# Patient Record
Sex: FEMALE | Race: OTHER | URBAN - METROPOLITAN AREA
[De-identification: names, ages, dates, MRNs, and addresses within clinical notes are randomized per-mention and may not be internally consistent; named-entity substitution may affect disease eponyms.]

---

## 2019-07-15 ENCOUNTER — OFFICE VISIT (OUTPATIENT)
Dept: ENDOCRINOLOGY CLINIC | Facility: CLINIC | Age: 19
End: 2019-07-15
Payer: COMMERCIAL

## 2019-07-15 VITALS
DIASTOLIC BLOOD PRESSURE: 95 MMHG | SYSTOLIC BLOOD PRESSURE: 132 MMHG | HEART RATE: 67 BPM | WEIGHT: 263.19 LBS | HEIGHT: 69 IN | BODY MASS INDEX: 38.98 KG/M2

## 2019-07-15 DIAGNOSIS — Z13.220 LIPID SCREENING: ICD-10-CM

## 2019-07-15 DIAGNOSIS — R79.89 HIGH SERUM TESTOSTERONE: ICD-10-CM

## 2019-07-15 DIAGNOSIS — Z13.1 DIABETES MELLITUS SCREENING: Primary | ICD-10-CM

## 2019-07-15 DIAGNOSIS — N97.0 ANOVULATORY: ICD-10-CM

## 2019-07-15 LAB
CARTRIDGE LOT#: NORMAL NUMERIC
GLUCOSE BLOOD: 95
HEMOGLOBIN A1C: 5.4 % (ref 4.3–5.6)
TEST STRIP LOT #: NORMAL NUMERIC

## 2019-07-15 PROCEDURE — 83036 HEMOGLOBIN GLYCOSYLATED A1C: CPT | Performed by: INTERNAL MEDICINE

## 2019-07-15 PROCEDURE — 99203 OFFICE O/P NEW LOW 30 MIN: CPT | Performed by: INTERNAL MEDICINE

## 2019-07-15 PROCEDURE — 82962 GLUCOSE BLOOD TEST: CPT | Performed by: INTERNAL MEDICINE

## 2019-07-15 PROCEDURE — 36416 COLLJ CAPILLARY BLOOD SPEC: CPT | Performed by: INTERNAL MEDICINE

## 2019-07-15 RX ORDER — METFORMIN HYDROCHLORIDE 500 MG/1
500 TABLET, EXTENDED RELEASE ORAL 2 TIMES DAILY WITH MEALS
Qty: 180 TABLET | Refills: 0 | Status: SHIPPED | OUTPATIENT
Start: 2019-07-15 | End: 2019-10-10

## 2019-07-15 NOTE — H&P
New Patient Evaluation - History and Physical    CONSULT - Reason for Visit:  PCOS  Requesting Physician: Self referral    CHIEF COMPLAINT:  Patient presents with:  Consult: Darkening around neck, inner arms and armpits. DM screening.  Diagnosed with PCOS i Allergies    SOCIAL HISTORY:    Social History    Socioeconomic History      Marital status: Single      Spouse name: Not on file      Number of children: Not on file      Years of education: Not on file      Highest education level: Not on file    Tobacco lymphadenopathy  LUNGS: clear to auscultation bilaterally, no crackles or wheezing  CARDIOVASCULAR:  regular rate and rhythm, normal S1 and S2  ABDOMEN:  soft  SKIN:  no bruising or bleeding, no rashes and no lesions, + darkening of skin around neck  EXTRE

## 2019-07-26 ENCOUNTER — TELEPHONE (OUTPATIENT)
Dept: ENDOCRINOLOGY CLINIC | Facility: CLINIC | Age: 19
End: 2019-07-26

## 2019-07-26 PROBLEM — E28.2 PCOS (POLYCYSTIC OVARIAN SYNDROME): Status: ACTIVE | Noted: 2019-07-26

## 2019-10-10 ENCOUNTER — TELEPHONE (OUTPATIENT)
Dept: ENDOCRINOLOGY CLINIC | Facility: CLINIC | Age: 19
End: 2019-10-10

## 2019-10-11 RX ORDER — METFORMIN HYDROCHLORIDE 500 MG/1
TABLET, EXTENDED RELEASE ORAL
Qty: 180 TABLET | Refills: 0 | Status: SHIPPED | OUTPATIENT
Start: 2019-10-11 | End: 2020-01-27

## 2020-01-25 ENCOUNTER — TELEPHONE (OUTPATIENT)
Dept: ENDOCRINOLOGY CLINIC | Facility: CLINIC | Age: 20
End: 2020-01-25

## 2020-01-27 RX ORDER — METFORMIN HYDROCHLORIDE 500 MG/1
TABLET, EXTENDED RELEASE ORAL
Qty: 60 TABLET | Refills: 0 | Status: SHIPPED | OUTPATIENT
Start: 2020-01-27

## 2021-10-08 ENCOUNTER — LAB REQUISITION (OUTPATIENT)
Dept: LAB | Age: 21
End: 2021-10-08

## 2021-10-08 DIAGNOSIS — Z30.09 ENCOUNTER FOR OTHER GENERAL COUNSELING AND ADVICE ON CONTRACEPTION: ICD-10-CM

## 2021-10-08 PROCEDURE — 84146 ASSAY OF PROLACTIN: CPT | Performed by: CLINICAL MEDICAL LABORATORY

## 2021-10-08 PROCEDURE — 84443 ASSAY THYROID STIM HORMONE: CPT | Performed by: CLINICAL MEDICAL LABORATORY

## 2021-10-09 LAB
PROLACTIN SERPL-MCNC: 5.3 NG/ML (ref 2.8–29.2)
TSH SERPL-ACNC: 1.28 MCUNITS/ML (ref 0.35–5)

## 2022-03-27 ENCOUNTER — WALK IN (OUTPATIENT)
Dept: URGENT CARE | Age: 22
End: 2022-03-27

## 2022-03-27 VITALS
SYSTOLIC BLOOD PRESSURE: 122 MMHG | DIASTOLIC BLOOD PRESSURE: 84 MMHG | WEIGHT: 265 LBS | TEMPERATURE: 98.2 F | RESPIRATION RATE: 16 BRPM | OXYGEN SATURATION: 96 % | HEART RATE: 75 BPM

## 2022-03-27 DIAGNOSIS — J03.90 TONSILLITIS WITH EXUDATE: Primary | ICD-10-CM

## 2022-03-27 PROCEDURE — 99203 OFFICE O/P NEW LOW 30 MIN: CPT | Performed by: NURSE PRACTITIONER

## 2022-03-27 PROCEDURE — 87070 CULTURE OTHR SPECIMN AEROBIC: CPT | Performed by: INTERNAL MEDICINE

## 2022-03-27 RX ORDER — LIDOCAINE HYDROCHLORIDE 20 MG/ML
15 SOLUTION OROPHARYNGEAL
Qty: 200 ML | Refills: 0 | Status: SHIPPED | OUTPATIENT
Start: 2022-03-27 | End: 2022-04-01

## 2022-03-27 RX ORDER — DEXAMETHASONE 4 MG/1
8 TABLET ORAL ONCE
Qty: 2 TABLET | Refills: 0 | Status: SHIPPED | OUTPATIENT
Start: 2022-03-27 | End: 2022-03-27

## 2022-03-27 RX ORDER — AMOXICILLIN AND CLAVULANATE POTASSIUM 875; 125 MG/1; MG/1
1 TABLET, FILM COATED ORAL EVERY 12 HOURS
Qty: 20 TABLET | Refills: 0 | Status: SHIPPED | OUTPATIENT
Start: 2022-03-27

## 2022-03-27 ASSESSMENT — ENCOUNTER SYMPTOMS: SORE THROAT: 1

## 2022-03-27 ASSESSMENT — PAIN SCALES - GENERAL: PAINLEVEL: 9

## 2022-03-29 ENCOUNTER — TELEPHONE (OUTPATIENT)
Dept: URGENT CARE | Age: 22
End: 2022-03-29

## 2022-03-29 LAB — BACTERIA THROAT AEROBE CULT: NORMAL

## 2022-06-27 ENCOUNTER — HOSPITAL ENCOUNTER (EMERGENCY)
Facility: HOSPITAL | Age: 22
Discharge: HOME | End: 2022-06-27
Attending: EMERGENCY MEDICINE
Payer: COMMERCIAL

## 2022-06-27 VITALS
HEART RATE: 69 BPM | SYSTOLIC BLOOD PRESSURE: 120 MMHG | DIASTOLIC BLOOD PRESSURE: 83 MMHG | RESPIRATION RATE: 16 BRPM | OXYGEN SATURATION: 95 % | BODY MASS INDEX: 39.25 KG/M2 | TEMPERATURE: 98 F | WEIGHT: 265 LBS | HEIGHT: 69 IN

## 2022-06-27 DIAGNOSIS — R42 DIZZINESS: Primary | ICD-10-CM

## 2022-06-27 LAB
B-HCG UR QL: NEGATIVE
BILIRUB UR QL: NEGATIVE
CLARITY UR: CLEAR
COLOR UR: YELLOW
GLUCOSE UR-MCNC: NEGATIVE MG/DL
KETONES UR-MCNC: NEGATIVE MG/DL
LEUKOCYTE ESTERASE UR QL STRIP.AUTO: NEGATIVE
NITRITE UR QL STRIP.AUTO: NEGATIVE
PH UR: 5.5 [PH] (ref 5–8)
PROT UR-MCNC: NEGATIVE MG/DL
SP GR UR STRIP: 1.02 (ref 1–1.03)
UROBILINOGEN UR STRIP-ACNC: 0.2

## 2022-06-27 PROCEDURE — 99283 EMERGENCY DEPT VISIT LOW MDM: CPT

## 2022-06-27 PROCEDURE — 81025 URINE PREGNANCY TEST: CPT

## 2022-06-27 NOTE — ED INITIAL ASSESSMENT (HPI)
Pt states intermittent dizziness since 2 pm. Pt states she was outside in warm weather and has been drinking alcohol this weekend. Pt believes it could be heat exhaustion. Pt currently taking prednisone and azithromycin for bronchitis.  NAD

## 2022-09-07 ENCOUNTER — WALK IN (OUTPATIENT)
Dept: URGENT CARE | Age: 22
End: 2022-09-07

## 2022-09-07 VITALS
OXYGEN SATURATION: 96 % | WEIGHT: 253 LBS | RESPIRATION RATE: 16 BRPM | SYSTOLIC BLOOD PRESSURE: 139 MMHG | HEIGHT: 69 IN | TEMPERATURE: 98.4 F | HEART RATE: 75 BPM | BODY MASS INDEX: 37.47 KG/M2 | DIASTOLIC BLOOD PRESSURE: 95 MMHG

## 2022-09-07 DIAGNOSIS — Z20.828 EXPOSURE TO HERPES SIMPLEX VIRUS (HSV): Primary | ICD-10-CM

## 2022-09-07 DIAGNOSIS — N76.0 ACUTE VAGINITIS: ICD-10-CM

## 2022-09-07 DIAGNOSIS — Z11.3 SCREENING EXAMINATION FOR STD (SEXUALLY TRANSMITTED DISEASE): ICD-10-CM

## 2022-09-07 DIAGNOSIS — Z20.2 POSSIBLE EXPOSURE TO STD: ICD-10-CM

## 2022-09-07 PROCEDURE — 99214 OFFICE O/P EST MOD 30 MIN: CPT | Performed by: FAMILY MEDICINE

## 2022-09-07 RX ORDER — VALACYCLOVIR HYDROCHLORIDE 1 G/1
1000 TABLET, FILM COATED ORAL 2 TIMES DAILY
Qty: 28 TABLET | Refills: 0 | Status: SHIPPED | OUTPATIENT
Start: 2022-09-07 | End: 2022-09-21

## 2022-09-07 RX ORDER — METFORMIN HYDROCHLORIDE 500 MG/1
500 TABLET, EXTENDED RELEASE ORAL DAILY
COMMUNITY
Start: 2022-07-27 | End: 2023-07-27

## 2022-09-07 RX ORDER — ESCITALOPRAM OXALATE 10 MG/1
10 TABLET ORAL DAILY
COMMUNITY
Start: 2022-07-27

## 2023-06-19 ENCOUNTER — OFFICE VISIT (OUTPATIENT)
Dept: INTERNAL MEDICINE CLINIC | Facility: CLINIC | Age: 23
End: 2023-06-19

## 2023-06-19 ENCOUNTER — LAB ENCOUNTER (OUTPATIENT)
Dept: LAB | Age: 23
End: 2023-06-19
Payer: COMMERCIAL

## 2023-06-19 VITALS
RESPIRATION RATE: 20 BRPM | DIASTOLIC BLOOD PRESSURE: 82 MMHG | TEMPERATURE: 98 F | WEIGHT: 254 LBS | HEART RATE: 80 BPM | BODY MASS INDEX: 37.62 KG/M2 | SYSTOLIC BLOOD PRESSURE: 134 MMHG | HEIGHT: 69 IN | OXYGEN SATURATION: 98 %

## 2023-06-19 DIAGNOSIS — Z91.89 HAS MULTIPLE SEXUAL PARTNERS: ICD-10-CM

## 2023-06-19 DIAGNOSIS — Z11.3 SCREEN FOR STD (SEXUALLY TRANSMITTED DISEASE): Primary | ICD-10-CM

## 2023-06-19 DIAGNOSIS — A60.09 HERPES SIMPLEX OF FEMALE GENITALIA: ICD-10-CM

## 2023-06-19 DIAGNOSIS — Z11.3 SCREEN FOR STD (SEXUALLY TRANSMITTED DISEASE): ICD-10-CM

## 2023-06-19 PROCEDURE — 87389 HIV-1 AG W/HIV-1&-2 AB AG IA: CPT

## 2023-06-19 PROCEDURE — 87491 CHLMYD TRACH DNA AMP PROBE: CPT

## 2023-06-19 PROCEDURE — 87340 HEPATITIS B SURFACE AG IA: CPT

## 2023-06-19 PROCEDURE — 86803 HEPATITIS C AB TEST: CPT

## 2023-06-19 PROCEDURE — 86780 TREPONEMA PALLIDUM: CPT

## 2023-06-19 PROCEDURE — 87591 N.GONORRHOEAE DNA AMP PROB: CPT

## 2023-06-19 PROCEDURE — 36415 COLL VENOUS BLD VENIPUNCTURE: CPT

## 2023-06-19 RX ORDER — EMTRICITABINE AND TENOFOVIR DISOPROXIL FUMARATE 200; 300 MG/1; MG/1
1 TABLET, FILM COATED ORAL DAILY
Qty: 90 TABLET | Refills: 0 | Status: SHIPPED | OUTPATIENT
Start: 2023-06-19

## 2023-06-19 RX ORDER — VALACYCLOVIR HYDROCHLORIDE 500 MG/1
500 TABLET, FILM COATED ORAL 2 TIMES DAILY
Qty: 6 TABLET | Refills: 0 | Status: SHIPPED | OUTPATIENT
Start: 2023-06-19 | End: 2023-06-22

## 2023-06-19 RX ORDER — FLUTICASONE PROPIONATE 110 UG/1
2 AEROSOL, METERED RESPIRATORY (INHALATION) 2 TIMES DAILY
COMMUNITY
Start: 2022-07-27 | End: 2023-07-27

## 2023-06-20 LAB
C TRACH DNA SPEC QL NAA+PROBE: NEGATIVE
HBV SURFACE AG SER-ACNC: 0.11 [IU]/L
HBV SURFACE AG SERPL QL IA: NONREACTIVE
HCV AB SERPL QL IA: NONREACTIVE
N GONORRHOEA DNA SPEC QL NAA+PROBE: NEGATIVE

## 2023-06-21 LAB — T PALLIDUM AB SER QL: NEGATIVE

## 2023-08-23 ENCOUNTER — OFFICE VISIT (OUTPATIENT)
Dept: INTERNAL MEDICINE CLINIC | Facility: CLINIC | Age: 23
End: 2023-08-23

## 2023-08-23 ENCOUNTER — LAB ENCOUNTER (OUTPATIENT)
Dept: LAB | Age: 23
End: 2023-08-23
Payer: COMMERCIAL

## 2023-08-23 VITALS
SYSTOLIC BLOOD PRESSURE: 116 MMHG | HEART RATE: 84 BPM | WEIGHT: 260 LBS | HEIGHT: 69 IN | BODY MASS INDEX: 38.51 KG/M2 | OXYGEN SATURATION: 99 % | DIASTOLIC BLOOD PRESSURE: 74 MMHG

## 2023-08-23 DIAGNOSIS — J45.21 INTERMITTENT ASTHMA WITH ACUTE EXACERBATION, UNSPECIFIED ASTHMA SEVERITY: ICD-10-CM

## 2023-08-23 DIAGNOSIS — Z11.3 SCREEN FOR STD (SEXUALLY TRANSMITTED DISEASE): ICD-10-CM

## 2023-08-23 DIAGNOSIS — R06.2 WHEEZING: ICD-10-CM

## 2023-08-23 DIAGNOSIS — R05.1 ACUTE COUGH: ICD-10-CM

## 2023-08-23 DIAGNOSIS — Z11.3 SCREEN FOR STD (SEXUALLY TRANSMITTED DISEASE): Primary | ICD-10-CM

## 2023-08-23 LAB
HBV SURFACE AG SER-ACNC: <0.1 [IU]/L
HBV SURFACE AG SERPL QL IA: NONREACTIVE
HCV AB SERPL QL IA: NONREACTIVE

## 2023-08-23 PROCEDURE — 86780 TREPONEMA PALLIDUM: CPT

## 2023-08-23 PROCEDURE — 87491 CHLMYD TRACH DNA AMP PROBE: CPT

## 2023-08-23 PROCEDURE — 86803 HEPATITIS C AB TEST: CPT

## 2023-08-23 PROCEDURE — 87389 HIV-1 AG W/HIV-1&-2 AB AG IA: CPT

## 2023-08-23 PROCEDURE — 3074F SYST BP LT 130 MM HG: CPT

## 2023-08-23 PROCEDURE — 87340 HEPATITIS B SURFACE AG IA: CPT

## 2023-08-23 PROCEDURE — 3008F BODY MASS INDEX DOCD: CPT

## 2023-08-23 PROCEDURE — 87591 N.GONORRHOEAE DNA AMP PROB: CPT

## 2023-08-23 PROCEDURE — 3078F DIAST BP <80 MM HG: CPT

## 2023-08-23 PROCEDURE — 36415 COLL VENOUS BLD VENIPUNCTURE: CPT

## 2023-08-23 PROCEDURE — 99214 OFFICE O/P EST MOD 30 MIN: CPT

## 2023-08-23 RX ORDER — BUDESONIDE 90 UG/1
2 AEROSOL, POWDER RESPIRATORY (INHALATION) 2 TIMES DAILY
COMMUNITY
Start: 2023-07-14 | End: 2023-08-23

## 2023-08-23 RX ORDER — ALBUTEROL SULFATE 2.5 MG/3ML
2.5 SOLUTION RESPIRATORY (INHALATION) EVERY 6 HOURS PRN
Qty: 12 ML | Refills: 0 | Status: SHIPPED | OUTPATIENT
Start: 2023-08-23 | End: 2023-09-06

## 2023-08-23 RX ORDER — PREDNISONE 20 MG/1
20 TABLET ORAL 2 TIMES DAILY
Qty: 14 TABLET | Refills: 0 | Status: SHIPPED | OUTPATIENT
Start: 2023-08-23 | End: 2023-08-30

## 2023-08-24 LAB
C TRACH DNA SPEC QL NAA+PROBE: NEGATIVE
N GONORRHOEA DNA SPEC QL NAA+PROBE: NEGATIVE

## 2023-08-25 LAB — T PALLIDUM AB SER QL: NEGATIVE

## 2023-12-05 ENCOUNTER — TELEPHONE (OUTPATIENT)
Dept: INTERNAL MEDICINE CLINIC | Facility: CLINIC | Age: 23
End: 2023-12-05

## 2023-12-05 NOTE — TELEPHONE ENCOUNTER
Future Appointments   Date Time Provider Drew Ornelas   12/6/2023 11:30 AM SONIA Calderón     Left message to call back. Nurse to follow:  Triage tightness in lower lungs.

## 2023-12-05 NOTE — TELEPHONE ENCOUNTER
Patient scheduled an appt via [employer] for the following symptoms:    breathing becomes hard when trying to deeply inhale, feels tight in lower part of lungs.

## 2023-12-06 ENCOUNTER — LAB ENCOUNTER (OUTPATIENT)
Dept: LAB | Age: 23
End: 2023-12-06
Attending: INTERNAL MEDICINE
Payer: COMMERCIAL

## 2023-12-06 ENCOUNTER — OFFICE VISIT (OUTPATIENT)
Dept: INTERNAL MEDICINE CLINIC | Facility: CLINIC | Age: 23
End: 2023-12-06
Payer: COMMERCIAL

## 2023-12-06 VITALS
SYSTOLIC BLOOD PRESSURE: 155 MMHG | BODY MASS INDEX: 40.58 KG/M2 | WEIGHT: 274 LBS | HEIGHT: 69 IN | OXYGEN SATURATION: 96 % | HEART RATE: 90 BPM | DIASTOLIC BLOOD PRESSURE: 110 MMHG

## 2023-12-06 DIAGNOSIS — Z11.3 SCREEN FOR STD (SEXUALLY TRANSMITTED DISEASE): ICD-10-CM

## 2023-12-06 DIAGNOSIS — J45.21 INTERMITTENT ASTHMA WITH ACUTE EXACERBATION, UNSPECIFIED ASTHMA SEVERITY: Primary | ICD-10-CM

## 2023-12-06 DIAGNOSIS — Z23 NEED FOR VACCINATION: ICD-10-CM

## 2023-12-06 PROCEDURE — 99214 OFFICE O/P EST MOD 30 MIN: CPT

## 2023-12-06 PROCEDURE — 90686 IIV4 VACC NO PRSV 0.5 ML IM: CPT

## 2023-12-06 PROCEDURE — 86780 TREPONEMA PALLIDUM: CPT

## 2023-12-06 PROCEDURE — 3080F DIAST BP >= 90 MM HG: CPT

## 2023-12-06 PROCEDURE — 87491 CHLMYD TRACH DNA AMP PROBE: CPT

## 2023-12-06 PROCEDURE — 87340 HEPATITIS B SURFACE AG IA: CPT

## 2023-12-06 PROCEDURE — 3008F BODY MASS INDEX DOCD: CPT

## 2023-12-06 PROCEDURE — 90471 IMMUNIZATION ADMIN: CPT

## 2023-12-06 PROCEDURE — 87591 N.GONORRHOEAE DNA AMP PROB: CPT

## 2023-12-06 PROCEDURE — 90472 IMMUNIZATION ADMIN EACH ADD: CPT

## 2023-12-06 PROCEDURE — 36415 COLL VENOUS BLD VENIPUNCTURE: CPT

## 2023-12-06 PROCEDURE — 90677 PCV20 VACCINE IM: CPT

## 2023-12-06 PROCEDURE — 86803 HEPATITIS C AB TEST: CPT

## 2023-12-06 PROCEDURE — 87389 HIV-1 AG W/HIV-1&-2 AB AG IA: CPT

## 2023-12-06 PROCEDURE — 3077F SYST BP >= 140 MM HG: CPT

## 2023-12-06 RX ORDER — PREDNISONE 20 MG/1
20 TABLET ORAL DAILY
Qty: 7 TABLET | Refills: 0 | Status: SHIPPED | OUTPATIENT
Start: 2023-12-06 | End: 2023-12-13

## 2023-12-06 RX ORDER — BUDESONIDE 90 UG/1
2 AEROSOL, POWDER RESPIRATORY (INHALATION) 2 TIMES DAILY
Qty: 1 EACH | Refills: 0 | Status: SHIPPED | OUTPATIENT
Start: 2023-12-06

## 2023-12-06 RX ORDER — MONTELUKAST SODIUM 10 MG/1
10 TABLET ORAL NIGHTLY
Qty: 90 TABLET | Refills: 0 | Status: SHIPPED | OUTPATIENT
Start: 2023-12-06

## 2023-12-07 LAB
C TRACH DNA SPEC QL NAA+PROBE: NEGATIVE
N GONORRHOEA DNA SPEC QL NAA+PROBE: NEGATIVE

## 2023-12-08 LAB — T PALLIDUM AB SER QL: NEGATIVE

## 2023-12-11 ENCOUNTER — NURSE TRIAGE (OUTPATIENT)
Dept: INTERNAL MEDICINE CLINIC | Facility: CLINIC | Age: 23
End: 2023-12-11

## 2023-12-11 ENCOUNTER — OFFICE VISIT (OUTPATIENT)
Facility: CLINIC | Age: 23
End: 2023-12-11

## 2023-12-11 VITALS
HEART RATE: 65 BPM | WEIGHT: 272 LBS | HEIGHT: 69 IN | BODY MASS INDEX: 40.29 KG/M2 | OXYGEN SATURATION: 97 % | DIASTOLIC BLOOD PRESSURE: 88 MMHG | RESPIRATION RATE: 18 BRPM | SYSTOLIC BLOOD PRESSURE: 140 MMHG

## 2023-12-11 DIAGNOSIS — L02.213 CUTANEOUS ABSCESS OF CHEST WALL: Primary | ICD-10-CM

## 2023-12-11 RX ORDER — SULFAMETHOXAZOLE AND TRIMETHOPRIM 800; 160 MG/1; MG/1
2 TABLET ORAL 2 TIMES DAILY
Qty: 28 TABLET | Refills: 0 | Status: SHIPPED | OUTPATIENT
Start: 2023-12-11 | End: 2023-12-18

## 2023-12-11 NOTE — TELEPHONE ENCOUNTER
Action Requested: Summary for Provider     []  Critical Lab, Recommendations Needed  [] Need Additional Advice  []   FYI    []   Need Orders  [] Need Medications Sent to Pharmacy  []  Other     SUMMARY: Per protocol disposition advised office visit today     Future Appointments   Date Time Provider Drew Ornelas   12/11/2023  1:40 PM Xiomara Denson MD [address]       Reason for call: Breast Problem  Onset: 3 days     Left breast wound x3 days, had what looked like a pimple, patient states she popped it. Symptoms of infection x2 days and include warmth, redness, intermittent yellow-green drainage and mild swelling. No pain. Afebrile. Denies other symptoms marked no under protocol.      Reason for Disposition   Breast looks infected (spreading redness, feels hot or painful to touch) and no fever    Protocols used: Breast Symptoms-A-OH

## 2023-12-12 ENCOUNTER — NURSE TRIAGE (OUTPATIENT)
Facility: CLINIC | Age: 23
End: 2023-12-12

## 2023-12-12 NOTE — TELEPHONE ENCOUNTER
Cal Round with lab called. MRSA swab for breast wound ordered/collected incorrectly. (Ordered as nasal swab).  Need to use E- swab to order culture

## 2024-02-05 ENCOUNTER — OFFICE VISIT (OUTPATIENT)
Dept: INTERNAL MEDICINE CLINIC | Facility: CLINIC | Age: 24
End: 2024-02-05

## 2024-02-05 ENCOUNTER — LAB ENCOUNTER (OUTPATIENT)
Dept: LAB | Age: 24
End: 2024-02-05
Payer: COMMERCIAL

## 2024-02-05 VITALS
WEIGHT: 271 LBS | DIASTOLIC BLOOD PRESSURE: 100 MMHG | BODY MASS INDEX: 40.14 KG/M2 | OXYGEN SATURATION: 97 % | HEIGHT: 69 IN | HEART RATE: 86 BPM | SYSTOLIC BLOOD PRESSURE: 142 MMHG

## 2024-02-05 DIAGNOSIS — R03.0 ELEVATED BLOOD PRESSURE READING IN OFFICE WITHOUT DIAGNOSIS OF HYPERTENSION: ICD-10-CM

## 2024-02-05 DIAGNOSIS — Z11.3 ROUTINE SCREENING FOR STI (SEXUALLY TRANSMITTED INFECTION): ICD-10-CM

## 2024-02-05 DIAGNOSIS — Z00.00 ROUTINE GENERAL MEDICAL EXAMINATION AT A HEALTH CARE FACILITY: ICD-10-CM

## 2024-02-05 DIAGNOSIS — Z11.52 ENCOUNTER FOR SCREENING FOR COVID-19: ICD-10-CM

## 2024-02-05 DIAGNOSIS — Z00.00 ROUTINE GENERAL MEDICAL EXAMINATION AT A HEALTH CARE FACILITY: Primary | ICD-10-CM

## 2024-02-05 LAB
BASOPHILS # BLD AUTO: 0.05 X10(3) UL (ref 0–0.2)
BASOPHILS NFR BLD AUTO: 0.5 %
DEPRECATED RDW RBC AUTO: 37.9 FL (ref 35.1–46.3)
EOSINOPHIL # BLD AUTO: 0.6 X10(3) UL (ref 0–0.7)
EOSINOPHIL NFR BLD AUTO: 5.7 %
ERYTHROCYTE [DISTWIDTH] IN BLOOD BY AUTOMATED COUNT: 11.4 % (ref 11–15)
HBV SURFACE AG SER-ACNC: <0.1 [IU]/L
HBV SURFACE AG SERPL QL IA: NONREACTIVE
HCT VFR BLD AUTO: 45.8 %
HCV AB SERPL QL IA: NONREACTIVE
HGB BLD-MCNC: 16.5 G/DL
IMM GRANULOCYTES # BLD AUTO: 0.03 X10(3) UL (ref 0–1)
IMM GRANULOCYTES NFR BLD: 0.3 %
LYMPHOCYTES # BLD AUTO: 3.17 X10(3) UL (ref 1–4)
LYMPHOCYTES NFR BLD AUTO: 30.4 %
MCH RBC QN AUTO: 32.7 PG (ref 26–34)
MCHC RBC AUTO-ENTMCNC: 36 G/DL (ref 31–37)
MCV RBC AUTO: 90.7 FL
MONOCYTES # BLD AUTO: 0.9 X10(3) UL (ref 0.1–1)
MONOCYTES NFR BLD AUTO: 8.6 %
NEUTROPHILS # BLD AUTO: 5.69 X10 (3) UL (ref 1.5–7.7)
NEUTROPHILS # BLD AUTO: 5.69 X10(3) UL (ref 1.5–7.7)
NEUTROPHILS NFR BLD AUTO: 54.5 %
PLATELET # BLD AUTO: 300 10(3)UL (ref 150–450)
RBC # BLD AUTO: 5.05 X10(6)UL
T PALLIDUM AB SER QL IA: NONREACTIVE
TSI SER-ACNC: 1.97 MIU/ML (ref 0.55–4.78)
WBC # BLD AUTO: 10.4 X10(3) UL (ref 4–11)

## 2024-02-05 PROCEDURE — 87389 HIV-1 AG W/HIV-1&-2 AB AG IA: CPT

## 2024-02-05 PROCEDURE — 86803 HEPATITIS C AB TEST: CPT

## 2024-02-05 PROCEDURE — 84443 ASSAY THYROID STIM HORMONE: CPT

## 2024-02-05 PROCEDURE — 3008F BODY MASS INDEX DOCD: CPT

## 2024-02-05 PROCEDURE — 99395 PREV VISIT EST AGE 18-39: CPT

## 2024-02-05 PROCEDURE — 87635 SARS-COV-2 COVID-19 AMP PRB: CPT

## 2024-02-05 PROCEDURE — 86780 TREPONEMA PALLIDUM: CPT

## 2024-02-05 PROCEDURE — 85025 COMPLETE CBC W/AUTO DIFF WBC: CPT

## 2024-02-05 PROCEDURE — 3080F DIAST BP >= 90 MM HG: CPT

## 2024-02-05 PROCEDURE — 3077F SYST BP >= 140 MM HG: CPT

## 2024-02-05 PROCEDURE — 87491 CHLMYD TRACH DNA AMP PROBE: CPT

## 2024-02-05 PROCEDURE — 87340 HEPATITIS B SURFACE AG IA: CPT

## 2024-02-05 PROCEDURE — 36415 COLL VENOUS BLD VENIPUNCTURE: CPT

## 2024-02-05 PROCEDURE — 87591 N.GONORRHOEAE DNA AMP PROB: CPT

## 2024-02-05 NOTE — PROGRESS NOTES
Subjective:   Dejah Catalan is a 24 year old adult who presents for Lab (Full sti std screening, wants a covid test for traveling) and Physical     Here for physical   Breathing is much better. They stopped the montelukast due to developing mood swings and anger     BP elevated in office and noted elevated at last several visits. Denies headaches, dizziness, chest pain, vision changes. Denies increased stress recently. Does have a new job working at [employer] in the early morning - wakes up at 3am and goes to sleep around 7:30 or 9pm. Hoping this will be a short-term job as in the summer they experience insomnia and cannot fall asleep until 3 or 4am.  Does not add salt to their food or get take out food  No current exercise     Requesting STI screening, no current symptoms. Will be going to an adult convention in March and anticipates multiple sexual partners so would like standing orders for STD testing for before and after the convention. States typically requests sexual partners share test results prior to intercourse and uses condoms with male partners and sometimes uses barrier protection with female partners. Also requesting a COVID test for before and after the convention.  We ordered PrEP in June 2023 but insurance did not cover it so it cost $600 and did not pick it up.     Preventive Care  Pap smear - [facility] and was normal   HPV - 11/04/2022, 09/15/2022 at [facility] - 01/19/2018 and states also done in June 2023    No cigarettes  Cannabis every 3 months   Alcohol none     History/Other:    Chief Complaint Reviewed and Verified  Nursing Notes Reviewed and   Verified  Tobacco Reviewed  Allergies Reviewed  Medications Reviewed    Problem List Reviewed  Medical History Reviewed  Surgical History   Reviewed  OB Status Reviewed  Family History Reviewed         Tobacco:  Dejah Catalan smoked tobacco in the past but quit greater than 12 months ago.  Social  History    Tobacco Use      Smoking status: Former        Types: Cigarettes      Smokeless tobacco: Never       Current Outpatient Medications   Medication Sig Dispense Refill    Budesonide (PULMICORT FLEXHALER) 90 MCG/ACT Inhalation Aerosol Powder, Breath Activated Inhale 2 puffs into the lungs 2 (two) times daily. 1 each 0    METFORMIN HCL  MG Oral Tablet 24 Hr TAKE 1 TABLET BY MOUTH TWICE A DAY WITH MEALS (Patient taking differently: Take 1 tablet (500 mg total) by mouth daily with breakfast.) 60 tablet 0         Review of Systems:  Review of Systems   Constitutional: Negative.    Respiratory: Negative.     Cardiovascular: Negative.    Gastrointestinal: Negative.    Skin: Negative.    Neurological: Negative.      Objective:   BP (!) 142/100   Pulse 86   Ht 5' 9\" (1.753 m)   Wt 271 lb (122.9 kg)   LMP 09/07/2023 (Approximate)   SpO2 97%   BMI 40.02 kg/m²  Estimated body mass index is 40.02 kg/m² as calculated from the following:    Height as of this encounter: 5' 9\" (1.753 m).    Weight as of this encounter: 271 lb (122.9 kg).  Physical Exam  Vitals reviewed.   Constitutional:       General: Dejah Catalan is not in acute distress.     Appearance: Normal appearance. Dejah Catalan is well-developed.   Cardiovascular:      Rate and Rhythm: Normal rate and regular rhythm.      Heart sounds: Normal heart sounds.   Pulmonary:      Effort: Pulmonary effort is normal.      Breath sounds: Normal breath sounds.   Abdominal:      General: Bowel sounds are normal.      Palpations: Abdomen is soft.   Skin:     General: Skin is warm and dry.   Neurological:      Mental Status: Dejah Catalan is alert and oriented to person, place, and time.       Assessment & Plan:   1. Routine general medical examination at a health care facility (Primary)  -     CBC With Differential With Platelet; Future; Expected date: 02/05/2024  -     Comp Metabolic Panel (14); Future; Expected date: 02/05/2024  -     Lipid Panel; Future;  Expected date: 02/05/2024  -     TSH W Reflex To Free T4; Future; Expected date: 02/05/2024  2. Routine screening for STI (sexually transmitted infection)  -     HCV Antibody; Future; Expected date: 02/05/2024  -     Hepatitis B Surface Antigen; Future; Expected date: 02/05/2024  -     HIV Ag/Ab Combo; Future; Expected date: 02/05/2024  -     T Pallidum Screening Cascade; Future; Expected date: 02/05/2024  -     Chlamydia/Gc Amplification; Future; Expected date: 02/05/2024  -     HCV Antibody; Standing  -     Hepatitis B Surface Antigen; Standing  -     HIV Ag/Ab Combo; Standing  -     T Pallidum Screening Cascade; Standing  -     Chlamydia/Gc Amplification; Standing  3. Encounter for screening for COVID-19  -     SARS-COV-22; Future; Expected date: 02/05/2024  -     SARS-COV-22; Standing  4. Elevated blood pressure reading in office without diagnosis of hypertension  Please monitor your blood pressure at home. Check your blood pressure morning and evening for the next week. Write down your readings and send a log in to the office after a week to determine if you need to be on blood pressure medication.     BP Readings from Last 3 Encounters:   02/05/24 (!) 142/100   12/11/23 140/88   12/06/23 (!) 155/110       SONIA Hernandez, 2/5/2024, 11:00 AM

## 2024-02-05 NOTE — PATIENT INSTRUCTIONS
Please monitor your blood pressure at home. Check your blood pressure morning and evening for the next week. Write down your readings and send a log in to the office after a week to determine if you need to be on blood pressure medication.     BP Readings from Last 3 Encounters:   02/05/24 (!) 142/100   12/11/23 140/88   12/06/23 (!) 155/110     Today your provider asked you to monitor your blood pressure at home.  Your provider would like you to view the following links:    A list of recommended blood pressure cuffs:    https://www.validatebp.org/  An instructional video (available in [place]ese & English) on how to take your own blood pressure:   https://targetbp.org/tools_downloads/self-measured-blood-pressure-video/

## 2024-02-06 LAB
C TRACH DNA SPEC QL NAA+PROBE: NEGATIVE
N GONORRHOEA DNA SPEC QL NAA+PROBE: NEGATIVE
SARS-COV-2 RNA RESP QL NAA+PROBE: NOT DETECTED

## 2024-03-02 DIAGNOSIS — J45.21 INTERMITTENT ASTHMA WITH ACUTE EXACERBATION, UNSPECIFIED ASTHMA SEVERITY (HCC): ICD-10-CM

## 2024-03-04 RX ORDER — MONTELUKAST SODIUM 10 MG/1
10 TABLET ORAL NIGHTLY
Qty: 90 TABLET | Refills: 0 | OUTPATIENT
Start: 2024-03-04

## 2024-03-28 ENCOUNTER — LAB ENCOUNTER (OUTPATIENT)
Dept: LAB | Age: 24
End: 2024-03-28
Payer: COMMERCIAL

## 2024-03-28 DIAGNOSIS — Z11.52 ENCOUNTER FOR SCREENING FOR COVID-19: ICD-10-CM

## 2024-03-28 DIAGNOSIS — Z11.3 ROUTINE SCREENING FOR STI (SEXUALLY TRANSMITTED INFECTION): ICD-10-CM

## 2024-03-28 LAB
HBV SURFACE AG SER-ACNC: 0.31 [IU]/L
HBV SURFACE AG SERPL QL IA: NONREACTIVE
HCV AB SERPL QL IA: NONREACTIVE
T PALLIDUM AB SER QL IA: NONREACTIVE

## 2024-03-28 PROCEDURE — 86803 HEPATITIS C AB TEST: CPT

## 2024-03-28 PROCEDURE — 87389 HIV-1 AG W/HIV-1&-2 AB AG IA: CPT

## 2024-03-28 PROCEDURE — 36415 COLL VENOUS BLD VENIPUNCTURE: CPT

## 2024-03-28 PROCEDURE — 87635 SARS-COV-2 COVID-19 AMP PRB: CPT

## 2024-03-28 PROCEDURE — 87491 CHLMYD TRACH DNA AMP PROBE: CPT

## 2024-03-28 PROCEDURE — 87340 HEPATITIS B SURFACE AG IA: CPT

## 2024-03-28 PROCEDURE — 86780 TREPONEMA PALLIDUM: CPT

## 2024-03-28 PROCEDURE — 87591 N.GONORRHOEAE DNA AMP PROB: CPT

## 2024-04-09 ENCOUNTER — LAB ENCOUNTER (OUTPATIENT)
Dept: LAB | Age: 24
End: 2024-04-09
Payer: COMMERCIAL

## 2024-04-09 DIAGNOSIS — Z00.00 ROUTINE GENERAL MEDICAL EXAMINATION AT A HEALTH CARE FACILITY: ICD-10-CM

## 2024-04-09 DIAGNOSIS — Z20.822 ENCOUNTER FOR LABORATORY TESTING FOR COVID-19 VIRUS: Primary | ICD-10-CM

## 2024-04-09 DIAGNOSIS — Z11.3 ROUTINE SCREENING FOR STI (SEXUALLY TRANSMITTED INFECTION): ICD-10-CM

## 2024-04-09 DIAGNOSIS — Z20.822 ENCOUNTER FOR LABORATORY TESTING FOR COVID-19 VIRUS: ICD-10-CM

## 2024-04-09 LAB
ALBUMIN SERPL-MCNC: 4.4 G/DL (ref 3.2–4.8)
ALBUMIN/GLOB SERPL: 1.6 {RATIO} (ref 1–2)
ALP LIVER SERPL-CCNC: 92 U/L
ALT SERPL-CCNC: 62 U/L
ANION GAP SERPL CALC-SCNC: 5 MMOL/L (ref 0–18)
AST SERPL-CCNC: 33 U/L (ref ?–34)
BILIRUB SERPL-MCNC: 0.5 MG/DL (ref 0.3–1.2)
BUN BLD-MCNC: 14 MG/DL (ref 9–23)
BUN/CREAT SERPL: 17.7 (ref 10–20)
CALCIUM BLD-MCNC: 9.4 MG/DL (ref 8.7–10.4)
CHLORIDE SERPL-SCNC: 111 MMOL/L (ref 98–112)
CHOLEST SERPL-MCNC: 204 MG/DL (ref ?–200)
CO2 SERPL-SCNC: 27 MMOL/L (ref 21–32)
CREAT BLD-MCNC: 0.79 MG/DL
EGFRCR SERPLBLD CKD-EPI 2021: 106 ML/MIN/1.73M2 (ref 60–?)
FASTING PATIENT LIPID ANSWER: YES
FASTING STATUS PATIENT QL REPORTED: YES
GLOBULIN PLAS-MCNC: 2.8 G/DL (ref 2.8–4.4)
GLUCOSE BLD-MCNC: 84 MG/DL (ref 70–99)
HDLC SERPL-MCNC: 45 MG/DL (ref 40–59)
LDLC SERPL CALC-MCNC: 128 MG/DL (ref ?–100)
NONHDLC SERPL-MCNC: 159 MG/DL (ref ?–130)
OSMOLALITY SERPL CALC.SUM OF ELEC: 296 MOSM/KG (ref 275–295)
POTASSIUM SERPL-SCNC: 4.1 MMOL/L (ref 3.5–5.1)
PROT SERPL-MCNC: 7.2 G/DL (ref 5.7–8.2)
SODIUM SERPL-SCNC: 143 MMOL/L (ref 136–145)
TRIGL SERPL-MCNC: 173 MG/DL (ref 30–149)
VLDLC SERPL CALC-MCNC: 31 MG/DL (ref 0–30)

## 2024-04-09 PROCEDURE — 36415 COLL VENOUS BLD VENIPUNCTURE: CPT

## 2024-04-09 PROCEDURE — 87591 N.GONORRHOEAE DNA AMP PROB: CPT

## 2024-04-09 PROCEDURE — 80061 LIPID PANEL: CPT

## 2024-04-09 PROCEDURE — 87491 CHLMYD TRACH DNA AMP PROBE: CPT

## 2024-04-09 PROCEDURE — 80053 COMPREHEN METABOLIC PANEL: CPT

## 2024-04-09 PROCEDURE — 87635 SARS-COV-2 COVID-19 AMP PRB: CPT

## 2024-06-15 ENCOUNTER — HOSPITAL ENCOUNTER (OUTPATIENT)
Age: 24
Discharge: HOME | End: 2024-06-15
Payer: COMMERCIAL

## 2024-06-15 VITALS — TEMPERATURE: 98 F | HEART RATE: 75 BPM | RESPIRATION RATE: 18 BRPM | OXYGEN SATURATION: 99 %

## 2024-06-15 DIAGNOSIS — S91.209A AVULSION OF TOENAIL, INITIAL ENCOUNTER: Primary | ICD-10-CM

## 2024-06-15 PROCEDURE — 99213 OFFICE O/P EST LOW 20 MIN: CPT | Performed by: NURSE PRACTITIONER

## 2024-06-15 PROCEDURE — 11730 AVULSION NAIL PLATE SIMPLE 1: CPT | Performed by: NURSE PRACTITIONER

## 2024-06-15 RX ORDER — LIDOCAINE HYDROCHLORIDE 10 MG/ML
5 INJECTION, SOLUTION EPIDURAL; INFILTRATION; INTRACAUDAL; PERINEURAL ONCE
Status: COMPLETED | OUTPATIENT
Start: 2024-06-15 | End: 2024-06-15

## 2024-06-15 NOTE — DISCHARGE INSTRUCTIONS
Clean the area with soap and water.  Apply topical antibiotic ointment twice a day.  Follow-up with podiatry

## 2024-06-15 NOTE — ED PROVIDER NOTES
Patient Seen in: [facility]      History     Chief Complaint   Patient presents with    Toe Injury     Stated Complaint: Toe Pain    Subjective:   24-year-old nonbinary female at birth with asthma presents from home with a right first toenail injury.  Patient states they jammed their right first toe on a door today.  States they have had an abnormal toenail all their life after an injury many years ago.  States had the toenail removed by a podiatrist due to ingrown toenails  and the toenail never grew back appropriately.  States now the nail is almost completely torn off.  They tried to remove it at home themselves.  Having some pain.  No pain medications taken at home.  Bleeding has stopped.    The history is provided by the patient. No  was used.           Objective:   History reviewed. No pertinent past medical history.           History reviewed. No pertinent surgical history.             Social History     Socioeconomic History    Marital status: Single   Tobacco Use    Smoking status: Former     Types: Cigarettes    Smokeless tobacco: Never   Vaping Use    Vaping status: Every Day   Substance and Sexual Activity    Alcohol use: Yes     Comment: OCC    Drug use: Yes     Types: Cannabis    Sexual activity: Not Currently     Social Determinants of Health     Financial Resource Strain: High Risk (7/27/2022)    Received from [facility]    Overall Financial Resource Strain (CARDIA)     Difficulty of Paying Living Expenses: Hard   Food Insecurity: Food Insecurity Present (7/27/2022)    Received from [facility]    Hunger Vital Sign     Worried About Running Out of Food in the Last Year: Sometimes true     Ran Out of Food in the Last Year: Sometimes true   Transportation Needs: Unmet Transportation Needs (7/27/2022)    Received from [facility]    PRAPARE - Transportation     Lack of Transportation  (Medical): Yes     Lack of Transportation (Non-Medical): Yes   Physical Activity: Inactive (7/27/2022)    Received from [facility]    Exercise Vital Sign     Days of Exercise per Week: 0 days     Minutes of Exercise per Session: 0 min              Review of Systems    Positive for stated complaint: Toe Pain  Other systems are as noted in HPI.  Constitutional and vital signs reviewed.      All other systems reviewed and negative except as noted above.    Physical Exam     ED Triage Vitals [06/15/24 1338]   BP    Pulse 75   Resp 18   Temp 97.9 °F (36.6 °C)   Temp src Temporal   SpO2 99 %   O2 Device None (Room air)       Current Vitals:   Vital Signs  Pulse: 75  Resp: 18  Temp: 97.9 °F (36.6 °C)  Temp src: Temporal    Oxygen Therapy  SpO2: 99 %  O2 Device: None (Room air)            Physical Exam  Vitals and nursing note reviewed.   Constitutional:       General: Dejah is not in acute distress.     Appearance: Normal appearance. Dejah is not ill-appearing or toxic-appearing.   HENT:      Head: Normocephalic and atraumatic.      Nose: Nose normal.      Mouth/Throat:      Mouth: Mucous membranes are moist.      Pharynx: Oropharynx is clear.   Eyes:      Pupils: Pupils are equal, round, and reactive to light.   Cardiovascular:      Rate and Rhythm: Normal rate.      Pulses: Normal pulses.   Pulmonary:      Effort: Pulmonary effort is normal. No respiratory distress.   Abdominal:      General: Abdomen is flat.      Palpations: Abdomen is soft.   Musculoskeletal:         General: No signs of injury. Normal range of motion.      Cervical back: Normal range of motion and neck supple.   Feet:      Comments: Trauma to right 1st toenail. Almost completely avulsed, intact only at the medial base/cuticle line. Bleeding controlled. No bony tenderness. No injury to nail bed.   Skin:     General: Skin is warm and dry.      Capillary Refill: Capillary refill takes less than 2 seconds.   Neurological:       General: No focal deficit present.      Mental Status: Dejah is alert and oriented to person, place, and time.      GCS: GCS eye subscore is 4. GCS verbal subscore is 5. GCS motor subscore is 6.   Psychiatric:         Mood and Affect: Mood normal.         Behavior: Behavior normal.         Thought Content: Thought content normal.         Judgment: Judgment normal.           ED Course   Labs Reviewed - No data to display  Toenail Avulsion Procedure Note    PROCEDURE: toenail avulsion      PROCEDURE:    The area surrounding the skin lesion was prepared and draped in the  usual sterile manner. The patient is placed in the supine position   The toe was prepped with povidone-iodine solution. A standard digital  block was performed, using a 10-mL syringe and a 27-gauge needle.  3 mL of lidocaine 1% on each side of the toe was injected for anesthesia.   Remaining toenail was removed with hemostats    Followup: The patient tolerated the procedure well without  complications.  Standard post-procedure care is explained and return  precautions are given.    MDM        Medical Decision Making  Differential diagnoses reflecting the complexity of care include: Toenail injury, toenail avulsion, toe fracture  Minor injury.  No bony tenderness.  Discussed the option of x-ray but low suspicion for bony injury.  X-ray deferred  Toenail was removed after digital block.  Nailbed is intact, no trauma to the nailbed.  No indication for sutures.  Xeroform gauze was to be placed but patient declined.  Bacitracin and Band-Aid applied.    Plan of Care: Local wound care.  Topical antibiotics.  Follow-up with dermatology, referral provided    Results and plan of care discussed with the patient/family. They are in agreement with discharge. They understand to follow up with their primary doctor or the referral physician for further evaluation, especially if no improvement.  Also discussed the limitations of immediate care, patient is aware  that if symptoms are worse they should go to the emergency room. Verbal and written discharge instructions were given.     Diagnostic tests and medications considered but not ordered were: xray of toe  Shared decision making was done by: declined xray                Problems Addressed:  Avulsion of toenail, initial encounter: acute illness or injury    Risk  OTC drugs.        Disposition and Plan     Clinical Impression:  1. Avulsion of toenail, initial encounter         Disposition:  Discharge  6/15/2024  2:04 pm    Follow-up:  Alma Mullins DPM  [address] [place] [id number]  [phone]                Medications Prescribed:  Discharge Medication List as of 6/15/2024  2:07 PM

## 2024-06-30 DIAGNOSIS — J45.21 INTERMITTENT ASTHMA WITH ACUTE EXACERBATION, UNSPECIFIED ASTHMA SEVERITY (HCC): ICD-10-CM

## 2024-07-03 RX ORDER — BUDESONIDE 90 UG/1
2 AEROSOL, POWDER RESPIRATORY (INHALATION) 2 TIMES DAILY
Qty: 1 EACH | Refills: 3 | Status: SHIPPED | OUTPATIENT
Start: 2024-07-03

## 2024-07-03 NOTE — TELEPHONE ENCOUNTER
Please review. Rx failed/no protocol.    Lawanda,    Please advise patient's message:  Patient comment: Would i be able to get 2 at one time? I travel to [place] frequently and would like to keep one with my friend for when i forget the one i have at home.     Requested Prescriptions   Pending Prescriptions Disp Refills    Budesonide (PULMICORT FLEXHALER) 90 MCG/ACT Inhalation Aerosol Powder, Breath Activated 1 each 0     Sig: Inhale 2 puffs into the lungs 2 (two) times daily.       Asthma & COPD Medication Protocol Failed - 6/30/2024  1:03 AM        Failed - Asthma Action Score greater than or equal to 20        Failed - AAP/ACT given in last 12 months     No data recorded  No data recorded  No data recorded  No data recorded          Passed - Appointment in past 6 or next 3 months      Recent Outpatient Visits              4 months ago Routine general medical examination at a health care facility    [facility], Lawanda Nettles, APRKAMERON    Office Visit    6 months ago [facility]    Office Visit    7 months ago Intermittent asthma with acute exacerbation, unspecified asthma severity (HCC)    [facility]    Office Visit    10 months ago Screen for STD (sexually transmitted disease)    [facility]    Office Visit    1 year ago Screen for STD (sexually transmitted disease)    [facility]    [facility] Visit                             Recent Outpatient Visits              4 months ago Routine general medical examination at a health care facility    [facility]    Office Visit    6 months ago Cutaneous abscess of chest wall    [facility], Esteban Hussein Laura, MD    Office Visit    7 months ago Intermittent asthma with acute exacerbation, unspecified asthma severity (HCC)    [facility], [facility] Elise Dogn Sarah, APRN    Office Visit    10 months ago Screen for STD (sexually transmitted disease)    [facility], [facility], Lawanda Nettles APRN    Office Visit    1 year ago Screen for STD (sexually transmitted disease)    [facility], [facility], Lawanda Nettles APRN    Office Visit

## 2025-01-07 ENCOUNTER — PATIENT MESSAGE (OUTPATIENT)
Dept: INTERNAL MEDICINE CLINIC | Facility: CLINIC | Age: 25
End: 2025-01-07

## 2025-01-07 DIAGNOSIS — Z11.3 SCREEN FOR STD (SEXUALLY TRANSMITTED DISEASE): Primary | ICD-10-CM

## 2025-01-07 NOTE — TELEPHONE ENCOUNTER
Javed Webber,   I hope this message finds you well. I wanted to request to have my usual Full STI screening that I do every couple of months. Would you be able to submit the lab requests for me please and thank you.     Dejah Ortega

## 2025-01-15 ENCOUNTER — TELEPHONE (OUTPATIENT)
Dept: INTERNAL MEDICINE CLINIC | Facility: CLINIC | Age: 25
End: 2025-01-15

## 2025-01-15 ENCOUNTER — TELEMEDICINE (OUTPATIENT)
Dept: INTERNAL MEDICINE CLINIC | Facility: CLINIC | Age: 25
End: 2025-01-15
Payer: COMMERCIAL

## 2025-01-15 DIAGNOSIS — Z20.822 CLOSE EXPOSURE TO COVID-19 VIRUS: Primary | ICD-10-CM

## 2025-01-15 PROCEDURE — 98004 SYNCH AUDIO-VIDEO EST SF 10: CPT | Performed by: INTERNAL MEDICINE

## 2025-01-15 NOTE — TELEPHONE ENCOUNTER
Patient called for an appointment with Lawanda SCOTT for Covid test.  Denies any symptoms but states 4 days ago she was in [facility] and was exposed to someone who tested positive for Covid.  Advised she could go to pharmacy and get home test.  Offered limited appointments this afternoon with Saavnnah SCOTT.  Advised on [facility] as option.  Patient made comment we were not trying to help her and ended the call.  Lawanda SCOTT would you be able to add patient or order Covid test?

## 2025-01-15 NOTE — TELEPHONE ENCOUNTER
Cannot test and treat with no appointment - can do video visit with any available provider or go to [facility]

## 2025-01-15 NOTE — TELEPHONE ENCOUNTER
Advised patient of Lawanda Mata's note. Patient verbalized understanding.Video visit made for today at 4:15pm with Dr Santos. Went over instructions, copay and that provider will be calling from a restricted/unknown number to make sure able to accept/pickup those calls. Patient agreed.

## 2025-01-15 NOTE — PROGRESS NOTES
Patient ID: Dejah Catalan is a 24 year old adult.  Chief Complaint   Patient presents with    Asymptomic Covid Testing          HISTORY OF PRESENT ILLNESS:   Patient presents for above.  This visit is conducted using Telemedicine with live, interactive video and audio.  Patient exposed to someone who tested positive for COVID today.  Patient without symptoms resolved.  She did take a rapid test at home that was negative today.  She is requesting an in lab COVID test.    Review of Systems   Constitutional: Negative.    HENT: Negative.     Eyes: Negative.    Respiratory: Negative.     Cardiovascular: Negative.    Gastrointestinal: Negative.    Endocrine: Negative.    Genitourinary: Negative.    Musculoskeletal: Negative.    Skin: Negative.    Allergic/Immunologic: Negative.    Neurological: Negative.    Hematological: Negative.    Psychiatric/Behavioral: Negative.        MEDICAL HISTORY:   History reviewed. No pertinent past medical history.    History reviewed. No pertinent surgical history.      Current Outpatient Medications:     Budesonide (PULMICORT FLEXHALER) 90 MCG/ACT Inhalation Aerosol Powder, Breath Activated, Inhale 2 puffs into the lungs 2 (two) times daily., Disp: 1 each, Rfl: 3    Allergies:Allergies[1]    Social History     Socioeconomic History    Marital status: Single     Spouse name: Not on file    Number of children: Not on file    Years of education: Not on file    Highest education level: Not on file   Occupational History    Not on file   Tobacco Use    Smoking status: Former     Types: Cigarettes    Smokeless tobacco: Never   Vaping Use    Vaping status: Every Day   Substance and Sexual Activity    Alcohol use: Yes     Comment: OCC    Drug use: Yes     Types: Cannabis    Sexual activity: Not Currently   Other Topics Concern    Not on file   Social History Narrative    Not on file     Social Drivers of Health     Financial Resource Strain: High Risk (7/27/2022)    Received from [facility]    Overall Financial Resource Strain (CARDIA)     Difficulty of Paying Living Expenses: Hard   Food Insecurity: Food Insecurity Present (7/27/2022)    Received from [facility]    Hunger Vital Sign     Worried About Running Out of Food in the Last Year: Sometimes true     Ran Out of Food in the Last Year: Sometimes true   Transportation Needs: Unmet Transportation Needs (7/27/2022)    Received from [facility]    PRAPARE - Transportation     Lack of Transportation (Medical): Yes     Lack of Transportation (Non-Medical): Yes   Physical Activity: Inactive (7/27/2022)    Received from [facility]    Exercise Vital Sign     Days of Exercise per Week: 0 days     Minutes of Exercise per Session: 0 min   Stress: Not on file   Social Connections: Unknown (8/25/2024)    Received from [facility]    Social Connections     Social Connections: Last Flowsheet Data: Not on file     Social Connections: Recent Result: Not on file   Housing Stability: Not on file       PHYSICAL EXAM:   Unable to perform vitals or do physical exam as this is a virtual video visit.  Patient appears alert.  No conversational dyspnea or distress.    ASSESSMENT/PLAN:   1. Close exposure to COVID-19 virus  COVID-19 Testing by PCR ([facility]) [E]; Future    Return if symptoms worsen or fail to improve.    Time spent on encounter  11 minutes             Dejah Catalan understands video evaluation is not a substitute for face-to-face examination or emergency care. Patient advised to go to ER or call 911 for worsening symptoms or acute distress.     Telehealth outside of [facility]  Telehealth Verbal Consent   I conducted a telehealth visit with Dejah Catalan today, 01/15/25, which was completed using two-way, real-time interactive audio and video communication. This has been done in good antonio to provide continuity of  care in the best interest of the provider-patient relationship, due to the COVID -19 public health crisis/national emergency where restrictions of face-to-face office visits are ongoing. Every conscious effort was taken to allow for sufficient and adequate time to complete the visit.  The patient was made aware of the limitations of the telehealth visit, including treatment limitations as no physical exam could be performed.  The patient was advised to call 911 or to go to the ER in case there was an emergency.  The patient was also advised of the potential privacy & security concerns related to the telehealth platform.   The patient was made aware of where to find [facility]'s notice of privacy practices, telehealth consent form and other related consent forms and documents.  which are located on the [facility] website. The patient verbally agreed to telehealth consent form, related consents and the risks discussed.    Lastly, the patient confirmed that they were in [place].   Included in this visit, time may have been spent reviewing labs, medications, radiology tests and decision making. Appropriate medical decision-making and tests are ordered as detailed in the plan of care above.  Coding/billing information is submitted for this visit based on complexity of care and/or time spent for the visit.    This note was prepared using Dragon Medical voice recognition dictation software. As a result errors may occur. When identified these errors have been corrected. While every attempt is made to correct errors during dictation discrepancies may still exist.    Newton Santos MD  1/15/2025       [1]   Allergies  Allergen Reactions    Primatene Asthma [Ephedrine-Guaifenesin] WHEEZING    Seasonal Coughing and Runny nose

## 2025-01-16 ENCOUNTER — LAB ENCOUNTER (OUTPATIENT)
Dept: LAB | Age: 25
End: 2025-01-16
Payer: COMMERCIAL

## 2025-01-16 DIAGNOSIS — Z20.822 CLOSE EXPOSURE TO COVID-19 VIRUS: ICD-10-CM

## 2025-01-16 DIAGNOSIS — Z11.3 SCREEN FOR STD (SEXUALLY TRANSMITTED DISEASE): ICD-10-CM

## 2025-01-16 LAB
HBV SURFACE AG SER-ACNC: <0.1 [IU]/L
HBV SURFACE AG SERPL QL IA: NONREACTIVE
T PALLIDUM AB SER QL IA: NONREACTIVE

## 2025-01-16 PROCEDURE — 87389 HIV-1 AG W/HIV-1&-2 AB AG IA: CPT

## 2025-01-16 PROCEDURE — 87340 HEPATITIS B SURFACE AG IA: CPT

## 2025-01-16 PROCEDURE — 87635 SARS-COV-2 COVID-19 AMP PRB: CPT

## 2025-01-16 PROCEDURE — 87591 N.GONORRHOEAE DNA AMP PROB: CPT

## 2025-01-16 PROCEDURE — 36415 COLL VENOUS BLD VENIPUNCTURE: CPT

## 2025-01-16 PROCEDURE — 86803 HEPATITIS C AB TEST: CPT

## 2025-01-16 PROCEDURE — 87491 CHLMYD TRACH DNA AMP PROBE: CPT

## 2025-01-16 PROCEDURE — 86780 TREPONEMA PALLIDUM: CPT

## 2025-01-17 LAB
C TRACH DNA SPEC QL NAA+PROBE: NEGATIVE
HCV AB SERPL QL IA: NONREACTIVE
N GONORRHOEA DNA SPEC QL NAA+PROBE: NEGATIVE
SARS-COV-2 RNA RESP QL NAA+PROBE: NOT DETECTED

## (undated) NOTE — LETTER
Patient Name: Dejah Catalan  : 2000  MRN: [id number]  Patient Address: [address]  [place] [address]      COVID-19 2024      [facility] is committed to the safety and well-being of our patients, visitors, employees, and communities. Please review this entire document. It includes information related to pending tests, positive results and aftercare.    Patients with pending COVID-19 test results should follow all care and home isolation instructions. An [facility] representative will call with your test results. Results will also be available on "[employer].    Home Isolation    If you have tested positive for COVID-19 OR if you are sick and suspect that you have COVID-19 but do not yet have test results, you should remain under home isolation and follow the below guidelines:    Step 1: Stay home and away from others until at least 24 hours after both:  Your symptoms are getting better overall, and  You have not had a fever (and are not using fever-reducing medication)    Step 2: Resume normal activities, and use added prevention strategies over the next five days. Clean your hands often, wear a well-fitting mask when around others and keep a distance from others. Since some people remain contagious beyond the “stay-at-home” period, taking added precautions can lower the chance of spreading respiratory viruses to others.    Follow your employer’s specific return to work policies as the above guidelines may not apply in all settings.     Talk to Your Healthcare Provider    If you test positive for COVID-19, notify your healthcare provider as soon as possible to discuss potential treatment options. Patients who are 65 years or older, immunocompromised, or have other high-risk conditions are candidates for oral antiviral treatment, such as paxlovid and molnupiravir. These treatments, when appropriate, should be started as early as possible to prevent mild symptoms from becoming  severe.     Seek Further Care  If your symptoms do not improve after one week, contact your healthcare provider. If you develop symptoms such as: extreme weakness, difficult breathing, persistent pain or pressure in the chest, or other symptoms that are severe or concerning to you, you should contact your healthcare provider or seek emergency medical attention.     10 Ways to Manage Your Health at Home    Stay home from work, school, and other public places. If you must go out, avoid using any kind of public transportation, ridesharing, or taxis.  If you cannot avoid using public transportation always wear a mask.  Carefully monitor your symptoms. If you are 65 years or older or have a high-risk condition don’t wait for symptoms to become severe to contact your healthcare provider.  If your symptoms get worse, call your healthcare provider immediately.   Get rest and stay hydrated.  If you have a medical appointment, call the healthcare provider ahead of time and tell them that you have or may have COVID-19.  For medical emergencies, call 911 and notify the dispatch personnel that you have or may have COVID-19.  Cover your cough and sneeze.  Wash your hands often with soap and water for at least 20 seconds or clean your hands with an alcohol-based hand  that contains at least 60% alcohol.  As much as possible, stay in a specific room and away from other people in your home. You should use a separate bathroom, if available. If you need to be around other people in or outside of the home, wear a facemask.  Avoid sharing personal items with other people in your household, like dishes, towels, and bedding.  Clean all surfaces that are touched often, like counters, tabletops, and doorknobs. Use household cleaning sprays or wipes according to the label instructions.    Additional Information    You can also get more information at the following websites:    Centers for Disease Control and Prevnetion (CDC):   https://www.cdc.gov/respiratory-viruses/guidance/respiratory-virus-guidance.html      [facility]: [email or web address].[email or web address].[facility]/topics-services/diseases-and-conditions/respiratory-disease/diseases/covid19.html

## (undated) NOTE — LETTER
8/16/2019              [id number]         Dear Zeynep Fuentes,    [facility] records indicate that the tests ordered for you by Kedar Lobo MD  have not been done.   If you have, in fact, already completed the te